# Patient Record
Sex: FEMALE | Race: WHITE | Employment: FULL TIME | URBAN - METROPOLITAN AREA
[De-identification: names, ages, dates, MRNs, and addresses within clinical notes are randomized per-mention and may not be internally consistent; named-entity substitution may affect disease eponyms.]

---

## 2019-07-22 ENCOUNTER — TELEPHONE (OUTPATIENT)
Dept: SURGERY | Age: 64
End: 2019-07-22

## 2019-07-22 NOTE — TELEPHONE ENCOUNTER
Called home and cell numbers. No answer. I left a vm message for her to call our office to schedule an appointment with Dr. Lubna Vasquez.

## 2019-07-29 ENCOUNTER — TELEPHONE (OUTPATIENT)
Dept: SURGERY | Age: 64
End: 2019-07-29

## 2019-07-29 NOTE — TELEPHONE ENCOUNTER
Patient called back after receiving a phone message to  her per fax referral from Dr. Margie Alas for skin tags. Patient stated that she wants a female doctor and that she will call Dr. Margie Alas back.

## 2020-06-11 ENCOUNTER — HOSPITAL ENCOUNTER (OUTPATIENT)
Dept: PHYSICAL THERAPY | Age: 65
Discharge: HOME | End: 2020-06-11
Payer: COMMERCIAL

## 2020-06-11 PROCEDURE — 97112 NEUROMUSCULAR REEDUCATION: CPT

## 2020-06-11 PROCEDURE — 97161 PT EVAL LOW COMPLEX 20 MIN: CPT

## 2020-06-11 NOTE — PROGRESS NOTES
PHYSICAL THERAPY - DAILY TREATMENT NOTE    Patient Name: Bulmaro Camera        Date: 2020  : 1955   Yes Patient  Verified  Visit #:   1     Insurance: Payor: Kaizena / Plan: [address] [place] [address] / Product Type: PPO /      In time: 4:02pm Out time: 4:36pm   Total Treatment Time: 34     Medicare/[facility] Time Tracking (below)   Total Timed Codes (min):  8 1:1 Treatment Time:  8     TREATMENT AREA =  Vestibular disorder [H81.90]    SUBJECTIVE  Pain Level (on 0 to 10 scale):  0  / 10   Medication Changes/New allergies or changes in medical history, any new surgeries or procedures?    no  If yes, update Summary List   Subjective Functional Status/Changes:  []  No changes reported     See POC         OBJECTIVE  Modalities Rationale:     8 min Neuromuscular Re-ed: [x]  See flow sheet   Rationale:    improve coordination, improve balance and increase proprioception to improve the patients ability to perform safe and sx free functional mobility. Billed With/As:   [x] TE   [] TA   [] Neuro   [] Self Care Patient Education: [x] Review HEP    [x] Progressed/Changed HEP based on:   [x] positioning   [x] body mechanics   [] transfers   [] heat/ice application    [] other:      Other Objective/Functional Measures:    See POC     Post Treatment Pain Level (on 0 to 10) scale:   0  / 10     ASSESSMENT  Assessment/Changes in Function:     See POC     []  See Progress Note/Recertification   Patient will continue to benefit from skilled PT services to modify and progress therapeutic interventions, address functional mobility deficits, address ROM deficits, address strength deficits, analyze and address soft tissue restrictions, analyze and cue movement patterns, analyze and modify body mechanics/ergonomics, assess and modify postural abnormalities and instruct in home and community integration to attain remaining goals.    Progress toward goals / Updated goals:    See newly established goals in POC PLAN  [x]  Upgrade activities as tolerated yes Continue plan of care   []  Discharge due to :    []  Other:      Therapist: Fabiana Valdivia    Date: 6/11/2020 Time: 1:42 PM     Future Appointments   Date Time Provider Lenore Sinha   6/11/2020  4:00 PM Aristeo Arevalo

## 2020-06-11 NOTE — PROGRESS NOTES
[address] PHYSICAL THERAPY  [address] [place] [address] [address],Svitlana Oconnor, [facility] - Phone: [phone]  Fax: [phone] / [address] [facility]  Patient Name: Michelle Pfeiffer : 1955   Medical   Diagnosis: Vestibular disorder [H81.90] Treatment Diagnosis: Vestibular disorder [H81.90]   Onset Date: 2020     Referral Source: Srinath Vann MD [facility]): 2020   Prior Hospitalization: See medical history Provider #: [id number]   Prior Level of Function: Symptom free lying down and neck extension; Daily ambulation    Comorbidities: Allergies, BMI >30, Kidney Stone, LBP, HTN, Hypercholesterolemia, Stent, Skin Cancer   Medications: Verified on Patient Summary List   The Plan of Care and following information is based on the information from the initial evaluation.   ===========================================================================================  Assessment / key information: Patient is 77 y.o. female who [facility] to [facility]. with diagnosis of Vestibular disorder [H81.90]. Patient reports spinning with supine lying 2020. Symptoms include dizziness, imbalance, and spinning. Symptoms increase with lying supine, neck extension, and turning the head quickly right. Patient denies hx of falls. Upon objective evaluation, patient demonstrates impaired use of vestibular input, decreased use hip/ankle balance strategies, path deviations with gait, and impaired static and dynamic standing balance. Shweta Vianey on the right was cleared and  positive for rotary nystagmus and symptoms including spinning (</=15\"). Patient scored 25/30 on FGA indicating decreased risk of falls with ambulation. Patient scored 24/100 (low handicap) on Dizziness Handicap Inventory indicating decreased quality of life and impaired functional mobility secondary to dizziness.  Patient can benefit from PT interventions to decrease r/o fall, improve safety with ADL's, & decrease sx with ADL's & to improve overall functional status.  ===========================================================================================  Eval Complexity: History HIGH Complexity :3+ comorbidities / personal factors will impact the outcome/ POC ;  Examination  HIGH Complexity : 4+ Standardized tests and measures addressing body structure, function, activity limitation and / or participation in recreation ; Presentation MEDIUM Complexity : Evolving with changing characteristics ; Decision Making Other outcome measures DHI  LOW ; Overall Complexity LOW   Problem List: decrease ROM, decrease strength, impaired gait/ balance, decrease ADL/ functional abilities, decrease activity tolerance, decrease flexibility/ joint mobility, decrease transfer abilities and other dizziness affecting functional mobility    Treatment Plan may include any combination of the following: Therapeutic exercise, Therapeutic activities, Neuromuscular re-education, Physical agent/modality, Gait/balance training, Manual therapy, Patient education, Self Care training, Functional mobility training, Home safety training and Stair training  Patient / Family readiness to learn indicated by: asking questions, trying to perform skills and interest  Persons(s) to be included in education: patient (P)  Barriers to Learning/Limitations: None  Measures taken, if barriers to learning:    Patient Goal (s): \"get rid of the spinning\"   Patient self reported health status: good  Rehabilitation Potential: good   Short Term Goals: To be accomplished in  1  weeks:  1) Patient to be educated in and compliant with post maneuver instructions.  Long Term Goals: To be accomplished in  4  weeks:  1) Patient to report >/=50% improvement in ability to lay supine in bed. 2) Patient to improve score on DHI to 10/100 indicating improved quality of life.    3) Patient to improve score on FGA to 28/30 indicating decreased fall risk with ambulation. Frequency / Duration:   Patient to be seen  1-2  times per week for 6-8  weeks:  Patient / Caregiver education and instruction: self care, activity modification and exercises  Therapist Signature: Andrea Jameson Date: 5/55/2813   Certification Period: 6/11/2020 to 9/10/2020 Time: 1:43 PM   ===========================================================================================  I certify that the above Physical Therapy Services are being furnished while the patient is under my care. I agree with the treatment plan and certify that this therapy is necessary. Physician Signature:        Date:       Time:     Please sign and return to [facility] at [facility]. or you may fax the signed copy to [phone]. Thank you.

## 2020-06-18 NOTE — PROGRESS NOTES
PHYSICAL THERAPY - DAILY TREATMENT NOTE    Patient Name: Lizzie Christina        Date: 2020  : 1955   yes Patient  Verified  Visit #:   2   of     Insurance: Payor: Petty Guadalupe / Plan: [address] [place] [address] / Product Type: PPO /      In time: 8:04 Out time: 8:24   Total Treatment Time: 20     Medicare/[employer] Time Tracking (below)   Total Timed Codes (min):  20 1:1 Treatment Time:  20     TREATMENT AREA =  Vestibular disorder [H81.90]    SUBJECTIVE  Pain Level (on 0 to 10 scale):  0  / 10   Medication Changes/New allergies or changes in medical history, any new surgeries or procedures?    no  If yes, update Summary List   Subjective Functional Status/Changes:  []  No changes reported     Vivien noticed when I go to bed vivien had less spinning. im not feeling as much when I tilt my head back         OBJECTIVE  Modalities Rationale:   N/A    4 min Neuro Re-education: Post Maneuver education and recommended instructions sleeping semi- recumbent (45 deg angle) for two nights; avoid sleeping in R sidelying for 1 week, maintaining vertical head alignment with ADLs, avoid going to  and dentist   Rationale:   Improve standing proprioception to improve patients ability to balance in the shower and stand at night    16 min Manual Therapy: Hallpike julianna on L (-); and Epley's maneuver in order to correct right BPPV   Rationale:      Decrease positional vertigo and dizziness to improve patient's ability to lie in bed and extend neck.     Billed With/As:   [] TE   [] TA   [x] Neuro   [] Self Care Patient Education: [x] Review HEP    [] Progressed/Changed HEP based on:   [] positioning   [x] body mechanics   [x] transfers   [] heat/ice application    [x] other: Gait and static standing balance     Other Objective/Functional Measures:    Decreased nystagmus and sx with R Hallpike compared to initial eval   Slight sx with transition from L S/L to sitting with L C/S rotation       Post Treatment Pain Level (on 0 to 10) scale:   0  / 10     ASSESSMENT  Assessment/Changes in Function:     Good tolerance for Epley maneuver for R BPPV, denies patient and dizziness post session. Patient educated in post maneuver guidelines in order to improve efficacy of maneuver. Patient instructed to wait in waiting room 10 minutes prior to driving home in order to avoid quick spins or brief bursts of vertigo as debris repositions itself immediately post maneuver. []  See Progress Note/Recertification   Patient will continue to benefit from skilled PT services to modify and progress therapeutic interventions, address functional mobility deficits, address ROM deficits, address strength deficits, analyze and cue movement patterns, analyze and modify body mechanics/ergonomics, assess and modify postural abnormalities, address imbalance/dizziness and instruct in home and community integration to attain remaining goals. Progress toward goals / Updated goals:    First visit after initial evaluation. Progress tx per POC.          PLAN  [x]  Upgrade activities as tolerated yes Continue plan of care   []  Discharge due to :    []  Other:      Therapist: Kodi Kwan    Date: 6/19/2020 Time: 10:51 AM     Future Appointments   Date Time Provider Lenore Sinha   6/24/2020  1:45 PM Merrilee Hight [facility]   6/29/2020  9:30 AM Anni Villa   7/15/2020  8:45 AM Desiree Chavez MD [facility]

## 2020-06-19 ENCOUNTER — HOSPITAL ENCOUNTER (OUTPATIENT)
Dept: PHYSICAL THERAPY | Age: 65
Discharge: HOME | End: 2020-06-19
Payer: COMMERCIAL

## 2020-06-19 PROCEDURE — 97140 MANUAL THERAPY 1/> REGIONS: CPT

## 2020-06-24 ENCOUNTER — HOSPITAL ENCOUNTER (OUTPATIENT)
Dept: PHYSICAL THERAPY | Age: 65
Discharge: HOME | End: 2020-06-24
Payer: COMMERCIAL

## 2020-06-24 PROCEDURE — 97140 MANUAL THERAPY 1/> REGIONS: CPT

## 2020-06-24 NOTE — PROGRESS NOTES
PHYSICAL THERAPY - DAILY TREATMENT NOTE    Patient Name: Xenia Parra        Date: 2020  : 1955   yes Patient  Verified  Visit #:  3     Insurance: Payor: Katie Nava / Plan: [address] [place] [address] / Product Type: PPO /      In time: 1:46 Out time: 2:01   Total Treatment Time: 15     Medicare/[facility] Time Tracking (below)   Total Timed Codes (min):  15 1:1 Treatment Time: 15     TREATMENT AREA =  Vestibular disorder [H81.90]    SUBJECTIVE  Pain Level (on 0 to 10 scale):  0  / 10   Medication Changes/New allergies or changes in medical history, any new surgeries or procedures?    no  If yes, update Summary List   Subjective Functional Status/Changes:  []  No changes reported     Swimming, went back to the locker room, my stuff in my locker and tilted my head to the right and started spinning. Sometimes at night if I go to lay down ill feel something and then sometimes I don't. As I was walking down the road I felt a lean while walking. OBJECTIVE  Modalities Rationale:   N/A    5 min Neuro Re-education: Post Maneuver education and recommended instructions sleeping semi- recumbent (45 deg angle) for two nights; avoid sleeping in R sidelying for 1 week, maintaining vertical head alignment with ADLs, avoid going to  and dentist   Rationale:   Improve standing proprioception to improve patients ability to balance in the shower and stand at night    10 min Manual Therapy: Hallpike julianna on R (+); and Epley's maneuver in order to correct right BPPV   Rationale:      Decrease positional vertigo and dizziness to improve patient's ability to lie in bed and extend neck.     Billed With/As:   [] TE   [] TA   [x] Neuro   [] Self Care Patient Education: [x] Review HEP    [] Progressed/Changed HEP based on:   [] positioning   [x] body mechanics   [x] transfers   [] heat/ice application    [x] other: Gait and static standing balance     Other Objective/Functional Measures:    Demonstrated marked nystagmus with R hallpike position for ~15 seconds  Slight sx with transition from supine with left rot and ext to S/L with L rotation and slight c/s flexion    Post Treatment Pain Level (on 0 to 10) scale:   0  / 10     ASSESSMENT  Assessment/Changes in Function:     Good tolerance for Epley maneuver for R BPPV, denies patient and dizziness post session. Educated patient in possible need for cervical collar in order to prevent patient from tipping head back for prolonged periods due to bifocals. Patient educated in post maneuver guidelines in order to improve efficacy of maneuver. Patient instructed to wait in waiting room 10 minutes prior to driving home in order to avoid quick spins or brief bursts of vertigo as debris repositions itself immediately post maneuver. []  See Progress Note/Recertification   Patient will continue to benefit from skilled PT services to modify and progress therapeutic interventions, address functional mobility deficits, address ROM deficits, address strength deficits, analyze and cue movement patterns, analyze and modify body mechanics/ergonomics, assess and modify postural abnormalities, address imbalance/dizziness and instruct in home and community integration to attain remaining goals. Progress toward goals / Updated goals:    1) Patient to be educated in and compliant with post maneuver instructions. Goal met (6/19/2020)     · Long Term Goals: To be accomplished in  4  weeks:  1) Patient to report >/=50% improvement in ability to lay supine in bed. Goal in progress  2) Patient to improve score on DHI to 10/100 indicating improved quality of life. 3) Patient to improve score on FGA to 28/30 indicating decreased fall risk with ambulation.             PLAN  [x]  Upgrade activities as tolerated yes Continue plan of care   []  Discharge due to :    []  Other:      Therapist: Yunier Mayen    Date: 6/24/2020 Time: 10:51 AM     Future Appointments   Date Time Provider Lenore Sinha   6/24/2020  1:45 PM Bk Tolbert [facility]   6/29/2020  9:30 AM Ian Dean [facility]   7/15/2020  8:45 AM Gerard Nunn MD [facility]

## 2020-06-29 ENCOUNTER — HOSPITAL ENCOUNTER (OUTPATIENT)
Dept: PHYSICAL THERAPY | Age: 65
Discharge: HOME | End: 2020-06-29
Payer: COMMERCIAL

## 2020-06-29 PROCEDURE — 97140 MANUAL THERAPY 1/> REGIONS: CPT

## 2020-06-29 NOTE — PROGRESS NOTES
PHYSICAL THERAPY - DAILY TREATMENT NOTE    Patient Name: Moriah Ordaz        Date: 2020  : 1955   yes Patient  Verified  Visit #:  4    Insurance: Payor: Clemente Goodpasture / Plan: [address] [place] [address] / Product Type: PPO /      In time: 9:32 Out time: 9:50   Total Treatment Time: 18     Medicare/[facility] Time Tracking (below)   Total Timed Codes (min):  18 1:1 Treatment Time: 18     TREATMENT AREA =  Vestibular disorder [H81.90]    SUBJECTIVE  Pain Level (on 0 to 10 scale):  0  / 10   Medication Changes/New allergies or changes in medical history, any new surgeries or procedures?    no  If yes, update Summary List   Subjective Functional Status/Changes:  []  No changes reported     Last night I went on a walk (leaning to the left) and felt myself drifting, this mornign didn't have any trouble. At night when I get into bed its not bothering me at all unless I turn my head to the right, it doesn't seems to be lasting as along, when I first go this - laying flat would make me dizzy now its changed to turning head to the right. This week I didn't do too bad, the only thing I did where I got a sensation she showed me a quilt - went to look up at it and I felt it           OBJECTIVE  Modalities Rationale:   N/A    8 min Neuro Re-education: Post Maneuver education and recommended instructions sleeping semi- recumbent (45 deg angle) for two nights; avoid sleeping in R sidelying for 1 week, maintaining vertical head alignment with ADLs, avoid going to  and dentist   Rationale:   Improve standing proprioception to improve patients ability to balance in the shower and stand at night    10 min Manual Therapy: Hallpike julianna on R (+); and Epley's maneuver in order to correct right BPPV   Rationale:      Decrease positional vertigo and dizziness to improve patient's ability to lie in bed and extend neck.     Billed With/As:   [] GIA   [] ARTURO   [x] Neuro   [] Self Care Patient Education: [x] Review HEP    [] Progressed/Changed HEP based on:   [] positioning   [x] body mechanics   [x] transfers   [] heat/ice application    [x] other: Johnson Caleb Daroff exercises      Other Objective/Functional Measures:    Demonstrated nystagmus with R hallpike position for ~13 seconds  Denies increased sx t/o maneuver   Post Treatment Pain Level (on 0 to 10) scale:   0  / 10     ASSESSMENT  Assessment/Changes in Function:     Good tolerance for Epley maneuver for R BPPV, denies patient and dizziness post session. Patient education in [address]. Patient educated in post maneuver guidelines in order to improve efficacy of maneuver. Patient instructed to wait in waiting room 10 minutes prior to driving home in order to avoid quick spins or brief bursts of vertigo as debris repositions itself immediately post maneuver. []  See Progress Note/Recertification   Patient will continue to benefit from skilled PT services to modify and progress therapeutic interventions, address functional mobility deficits, address ROM deficits, address strength deficits, analyze and cue movement patterns, analyze and modify body mechanics/ergonomics, assess and modify postural abnormalities, address imbalance/dizziness and instruct in home and community integration to attain remaining goals. Progress toward goals / Updated goals:    1) Patient to be educated in and compliant with post maneuver instructions. Goal met (6/29/2020)     · Long Term Goals: To be accomplished in  4  weeks:  1) Patient to report >/=50% improvement in ability to lay supine in bed. Goal in progress reports one episode of dizziness with looking up   2) Patient to improve score on DHI to 10/100 indicating improved quality of life. 3) Patient to improve score on FGA to 28/30 indicating decreased fall risk with ambulation.             PLAN  [x]  Upgrade activities as tolerated yes Continue plan of care   []  Discharge due to :    []  Other:      Therapist: Neal Garcia Jeffy Dominguez    Date: 6/29/2020 Time: 10:51 AM     Future Appointments   Date Time Provider Lenore Sinha   6/29/2020  9:30 AM Nicole Pratt [facility]   7/15/2020  8:45 AM Kraen Palma MD [facility]

## 2020-07-07 ENCOUNTER — HOSPITAL ENCOUNTER (OUTPATIENT)
Dept: PHYSICAL THERAPY | Age: 65
End: 2020-07-07

## 2020-07-07 NOTE — PROGRESS NOTES
[address] PHYSICAL THERAPY   Rolanda Paulino [address],[facility], [facility] - Phone: [phone]  Fax: [phone]  DISCHARGE SUMMARY  Patient Name: Joan Thakkar : 1955   Treatment/Medical Diagnosis: Vestibular disorder [H81.90]   Referral Source: Jihan Valadez MD     Date of Initial Visit: 2020 Attended Visits: 4 Missed Visits: 1     SUMMARY OF TREATMENT  Patient has attended 4 PT sessions, including an initial evaluation, for dizziness. PT has included manual therapy - Epley's Maneuver, static standing balance, Aubree Sleeper, patient education, and home exercise program to decrease dizziness and spinning with bed mobility. CURRENT STATUS  The pt has progressed well with therapy, consistently reporting decreased dizziness and improved sx with functional mobility. Patient underwent Epley's Maneuver for R BPPV and currently denies sx and nystagmus with R Boris. [address] decreased from 24/100 to 0/100. Denies sx with gardening, laying supine, quick head turns, and looking up. Based on progress with participation in PT services and pt reported improvement, patient is appropriate for DC to home mgt of sx at this time. Goal/Measure of Progress Goal Met? 1. Patient to be educated in and compliant with post maneuver instructions. Status at last Eval: Goal Established Current Status: Compliant with post maneuver instructions yes   2. Patient to report >/=50% improvement in ability to lay supine in bed. Status at last Eval: Spinning with laying in supine Current Status: Denies dizziness with laying in supine yes   3. Patient to improve score on DHI to 10/100 indicating improved quality of life   Status at last Eval: [address] = 24/100 Current Status: [address] = 0/100 yes   4. Patient to improve score on FGA to 28/30 indicating decreased fall risk with ambulation.     Status at last Eval: FGA = 25/100 Current Status: Unable to be assessed no RECOMMENDATIONS  Discontinue therapy. Progressing towards or have reached established goals. If you have any questions/comments please contact us directly at [phone]. Thank you for allowing us to assist in the care of your patient.     Therapist Signature: Deann Stone Date: 6/29/2020     Time: 8:06 AM

## 2022-01-03 DIAGNOSIS — M25.561 RIGHT KNEE PAIN, UNSPECIFIED CHRONICITY: Primary | ICD-10-CM

## 2022-01-07 ENCOUNTER — OFFICE VISIT (OUTPATIENT)
Dept: ORTHOPEDIC SURGERY | Age: 67
End: 2022-01-07
Payer: COMMERCIAL

## 2022-01-07 VITALS — HEIGHT: 63 IN | BODY MASS INDEX: 30.48 KG/M2 | WEIGHT: 172 LBS

## 2022-01-07 DIAGNOSIS — M25.561 RIGHT KNEE PAIN, UNSPECIFIED CHRONICITY: Primary | ICD-10-CM

## 2022-01-07 PROCEDURE — 99203 OFFICE O/P NEW LOW 30 MIN: CPT | Performed by: ORTHOPAEDIC SURGERY

## 2022-01-07 NOTE — PROGRESS NOTES
Name: Micheal Phillips    : 1955     Service Dept: [address] and Sports Medicine    Chief Complaint   Patient presents with    Knee Pain        Visit Vitals  Ht 5' 3\" (1.6 m)   Wt 172 lb (78 kg)   BMI 30.47 kg/m²        No Known Allergies     Current Outpatient Medications   Medication Sig Dispense Refill    aspirin delayed-release (Aspirin Low Dose) 81 mg tablet Take  by mouth.  Bepreve 1.5 % drop instill 1 drop INTO AFFECTED EYE(S) twice a day      ciprofloxacin HCl (CILOXAN) 0.3 % ophthalmic solution APPLY 1 DROP INTO RIGHT EYE 3 TIMES A DAY. START 2 DAYS PRIOR TO SURGERY IN THE FIRST SURGERY EYE      Premarin 0.625 mg/gram vaginal cream INSERT 0.5 GRAMS TWICE A WEEK BY VAGINAL ROUTE      fexofenadine (ALLEGRA) 60 mg tablet Take  by mouth.  ketorolac (ACULAR) 0.5 % ophthalmic solution APPLY 1 DROP INTO RIGHT EYE 3 TIMES A DAY. START 2 DAYS PRIOR TO SURGERY      prednisoLONE acetate (PRED FORTE) 1 % ophthalmic suspension apply 1 drop into right eye three times a day      nitrofurantoin (MACRODANTIN) 100 mg capsule Take 1 Cap by mouth two (2) times daily (with meals). 10 Cap 0    ferrous sulfate 324 mg (65 mg iron) tablet take 1 tablet by mouth once daily with ORANGE JUICE      omeprazole (PRILOSEC) 40 mg capsule take 1 capsule by mouth once daily 30 MINUTES BEFORE BREAKFAST      triamcinolone (NASACORT AQ) 55 mcg nasal inhaler 2 Sprays daily.  nitroglycerin (NITROSTAT) 0.4 mg SL tablet 0.4 mg by SubLINGual route.  atorvastatin (LIPITOR) 80 mg tablet take 1 tablet by mouth at bedtime      clotrimazole-betamethasone (Lotrisone) topical cream Lotrisone 1 %-0.05 % topical cream   APPLY TO THE AFFECTED AND SURROUNDING AREAS OF SKIN BY TOPICAL ROUTE 2 TIMES PER DAY IN THE MORNING AND EVENING FOR 2 WEEKS      losartan potassium (COZAAR PO) losartan        There is no problem list on file for this patient.      Family History   Problem Relation Age of Onset    Lung Cancer Father         COD    Colon Cancer Paternal Aunt         COD    Lung Cancer Maternal Aunt         COD      Social History     Socioeconomic History    Marital status:    Tobacco Use    Smoking status: Never Smoker    Smokeless tobacco: Never Used   Substance and Sexual Activity    Alcohol use: Yes     Alcohol/week: 1.0 standard drink     Types: 1 Glasses of wine per week      Past Surgical History:   Procedure Laterality Date    HX CATARACT REMOVAL Right 2021    HX CORONARY STENT PLACEMENT        Past Medical History:   Diagnosis Date    H/O heart artery stent     High cholesterol     Hypertension     Vertigo         I have reviewed and agree with [facility] and ROS and intake form in chart and the record furthermore I have reviewed prior medical record(s) regarding this patients care during this appointment. Review of Systems:   Patient is a pleasant appearing individual, appropriately dressed, well hydrated, well nourished, who is alert, appropriately oriented for age, and in no acute distress with a normal gait and normal affect who does not appear to be in any significant pain. Physical Exam:  Right Knee - Slight decrease range of motion, Grossly neurovascularly intact, Good cap refill, No skin lesions, Mild swelling, No gross instability, Some Quadriceps weakness, Positive medial joint line tenderness, Positive Fartun's exam    Left Knee - Normal range of motion, Grossly neurovascularly intact, Good cap refill, No skin lesions, No swelling, No gross instability, No weakness, No medial joint line tenderness, Negative Fartun's exam   Encounter Diagnoses     ICD-10-CM ICD-9-CM   1. Right knee pain, unspecified chronicity  M25.561 719.46       HPI:  The patient is here with a chief complaint of bilateral knee pain. Throbbing, burning pain. Progressively getting worse. Pain is 9/10.     X-rays of the right knee are unremarkable except for mild-to-moderate OA.    Assessment/Plan:  Plan will be for an MRI of the right knee rule out meniscus tear. I will see the patient post MRI and go from there. As part of continued conservative pain management options the patient was advised to utilize Tylenol or OTC NSAIDS as long as it is not medically contraindicated. Return to Office: Follow-up and Dispositions    · Return for POST MRI. Scribed by Joanna Hardin LPN as dictated by [facility] [place] COLT Smith MD.  Documentation True and Accepted [facility] COLT Smith MD

## 2022-01-07 NOTE — PATIENT INSTRUCTIONS
Meniscus Tear: Care Instructions  Overview     The meniscus is rubbery tissue in the knee that acts as a shock absorber between the upper and lower leg bones. The meniscus also keeps your knee stable by spreading weight across it. Each knee has two menisci (plural of meniscus). You can tear a meniscus if you plant your foot and twist, or pivot. The meniscus also can wear down as you age, and it can tear from squatting or kneeling. Small tears may heal on their own with rest and some physical therapy. But a more serious tear may need surgery to repair it or to remove part of the meniscus. Your doctor may want you to see a doctor who specializes in bones and sports injuries. Follow-up care is a key part of your treatment and safety. Be sure to make and go to all appointments, and call your doctor if you are having problems. It's also a good idea to know your test results and keep a list of the medicines you take. How can you care for yourself at home? · Rest your knee when possible. · Do not squat or kneel. · Take pain medicines exactly as directed. ? If the doctor gave you a prescription medicine for pain, take it as prescribed. ? If you are not taking a prescription pain medicine, ask your doctor if you can take an over-the-counter medicine. · Put ice or a cold pack on your knee for 10 to 20 minutes at a time. Try to do this every 1 to 2 hours for the next 3 days (when you are awake) or until the swelling goes down. Put a thin cloth between the ice and your skin. · Prop up the sore leg on a pillow when you ice your knee or any time you sit or lie down during the next 3 days. Try to keep your leg above the level of your heart. This will help reduce swelling. · Follow your doctor's directions for using crutches or a knee brace, if suggested. · Follow your doctor's directions for exercises to keep your knee mobile and your leg muscles strong.  Here are a few exercises you can try if your doctor says it is okay. ? Quad sets: Lie down on the floor or the bed with your injured leg straight. Fully extend your legthere should be no or little bend in your knee. Tighten the thigh (quadriceps) of your injured leg for 6 seconds. Do not lift your heel up. Relax your quadriceps for 10 seconds. Repeat this exercise 8 to 12 times several times during the day. ? Straight-leg raises: Lie down on the floor or the bed with your injured leg flat and your uninjured leg bent so that the bottom of your foot is on the floor or bed. Tighten the quadriceps of your injured leg. Keeping your knee as straight as possible, lift your injured leg off the bed until it is about 18 inches above the bed or floor. Lower your leg back down and relax for 5 seconds. Do 3 sets of 20 repetitions, or if you tire quickly, 3 sets of 8 to 12 repetitions. ? Heel raises: Stand with your feet a few inches apart. Rest your hands lightly on a counter or chair in front of you. Slowly raise your heels off the floor while keeping your knees straight. Hold for 3 seconds, then slowly lower your heels to the floor. Do 3 sets of 8 to 12 repetitions. ? Heel slides: Lie down on the floor or the bed with your leg flat. Slowly begin to slide your heel toward your rear end (buttocks), keeping your heel on the floor. Your knee will begin to bend. Slide your heel and bend your knee until it becomes a little sore and you can feel a small amount of pressure inside your knee. Hold this position for 10 seconds. Slide your heel back down until your leg is straight on the floor. Relax for 10 seconds. Repeat this exercise 20 times. When should you call for help? Watch closely for changes in your health, and be sure to contact your doctor if:    · You have increasing knee pain or swelling or both.     · Your knee is so sore or stiff that you cannot walk on it.     · You do not get better as expected. Where can you learn more?   Go to http://www.gray.com/  Enter [id number] in the search box to learn more about \"Meniscus Tear: Care Instructions. \"  Current as of: July 1, 2021               Content Version: 13.0  © 2020-0489 [employer]. Care instructions adapted under license by [employer] (which disclaims liability or warranty for this information). If you have questions about a medical condition or this instruction, always ask your healthcare professional. [address] any warranty or liability for your use of this information.

## 2022-01-07 NOTE — LETTER
1/10/2022    Patient: Jordy Vick   YOB: 1955   Date of Visit: 1/7/2022     Moe Gutierrez MD  [address] [place] [id number]  Via Fax: [phone]    Dear Moe Gutierrez MD,      Thank you for referring Ms. Tom Kim to [address] [place] [address] AND SPORTS MEDICINE for evaluation. My notes for this consultation are attached. If you have questions, please do not hesitate to call me. I look forward to following your patient along with you.       Sincerely,    Alejo Bloch, MD

## 2022-01-10 DIAGNOSIS — M25.561 RIGHT KNEE PAIN, UNSPECIFIED CHRONICITY: ICD-10-CM

## 2022-02-09 DIAGNOSIS — M25.561 RIGHT KNEE PAIN, UNSPECIFIED CHRONICITY: ICD-10-CM

## 2022-02-11 ENCOUNTER — OFFICE VISIT (OUTPATIENT)
Dept: ORTHOPEDIC SURGERY | Age: 67
End: 2022-02-11
Payer: COMMERCIAL

## 2022-02-11 DIAGNOSIS — M17.11 OSTEOARTHRITIS OF RIGHT KNEE, UNSPECIFIED OSTEOARTHRITIS TYPE: ICD-10-CM

## 2022-02-11 DIAGNOSIS — M25.561 RIGHT KNEE PAIN, UNSPECIFIED CHRONICITY: Primary | ICD-10-CM

## 2022-02-11 PROCEDURE — 99213 OFFICE O/P EST LOW 20 MIN: CPT | Performed by: ORTHOPAEDIC SURGERY

## 2022-02-11 RX ORDER — DIFLUPREDNATE 0.5 MG/ML
EMULSION OPHTHALMIC
COMMUNITY
Start: 2022-01-10

## 2022-02-11 RX ORDER — DOXYCYCLINE 100 MG/1
100 CAPSULE ORAL 2 TIMES DAILY
COMMUNITY
Start: 2021-12-21 | End: 2022-06-22 | Stop reason: ALTCHOICE

## 2022-02-11 RX ORDER — LOSARTAN POTASSIUM 25 MG/1
25 TABLET ORAL DAILY
COMMUNITY
Start: 2022-01-25

## 2022-02-11 RX ORDER — NEOMYCIN SULFATE, POLYMYXIN B SULFATE, AND DEXAMETHASONE 3.5; 10000; 1 MG/G; [USP'U]/G; MG/G
OINTMENT OPHTHALMIC
COMMUNITY
Start: 2021-12-09 | End: 2022-06-22 | Stop reason: ALTCHOICE

## 2022-02-11 RX ORDER — ERGOCALCIFEROL 1.25 MG/1
50000 CAPSULE ORAL DAILY
COMMUNITY
Start: 2022-02-08

## 2022-02-11 RX ORDER — LOTEPREDNOL ETABONATE AND TOBRAMYCIN 5; 3 MG/ML; MG/ML
SUSPENSION/ DROPS OPHTHALMIC
COMMUNITY
Start: 2021-11-10 | End: 2022-06-22 | Stop reason: ALTCHOICE

## 2022-02-11 NOTE — LETTER
2/15/2022    Patient: Alida Saucedo   YOB: 1955   Date of Visit: 2/11/2022     Mamta Rosario MD  [address] [place] [id number]  Via Fax: [phone]    Dear Mamta Rosario MD,      Thank you for referring Ms. Nighat Tee to [facility] AND SPORTS MEDICINE for evaluation. My notes for this consultation are attached. If you have questions, please do not hesitate to call me. I look forward to following your patient along with you.       Sincerely,    Holly Eppreson MD

## 2022-02-14 NOTE — PATIENT INSTRUCTIONS
Knee Arthritis: Care Instructions  Your Care Instructions     Knee arthritis is a breakdown of the cartilage that cushions your knee joint. When the cartilage wears down, your bones rub against each other. This causes pain and stiffness. Knee arthritis tends to get worse with time. Treatment for knee arthritis involves reducing pain, making the leg muscles stronger, and staying at a healthy body weight. The treatment usually does not improve the health of the cartilage, but it can reduce pain and improve how well your knee works. You can take simple measures to protect your knee joints, ease your pain, and help you stay active. Follow-up care is a key part of your treatment and safety. Be sure to make and go to all appointments, and call your doctor if you are having problems. It's also a good idea to know your test results and keep a list of the medicines you take. How can you care for yourself at home? · Know that knee arthritis will cause more pain on some days than on others. · Stay at a healthy weight. Lose weight if you are overweight. When you stand up, the pressure on your knees from every pound of body weight is multiplied four times. So if you lose 10 pounds, you will reduce the pressure on your knees by 40 pounds. · Talk to your doctor or physical therapist about exercises that will help ease joint pain. ? Stretch to help prevent stiffness and to prevent injury before you exercise. You may enjoy gentle forms of yoga to help keep your knee joints and muscles flexible. ? Walk instead of jog.  ? Ride a bike. This makes your thigh muscles stronger and takes pressure off your knee. ? Wear well-fitting and comfortable shoes. ? Exercise in chest-deep water. This can help you exercise longer with less pain. ? Avoid exercises that include squatting or kneeling. They can put a lot of strain on your knees.   ? Talk to your doctor to make sure that the exercise you do is not making the arthritis worse.  · Do not sit for long periods of time. Try to walk once in a while to keep your knee from getting stiff. · Ask your doctor or physical therapist whether shoe inserts may reduce your arthritis pain. · If you can afford it, get new athletic shoes at least every year. This can help reduce the strain on your knees. · Use a device to help you do everyday activities. ? A cane or walking stick can help you keep your balance when you walk. Hold the cane or walking stick in the hand opposite the painful knee. ? If you feel like you may fall when you walk, try using crutches or a front-wheeled walker. These can prevent falls that could cause more damage to your knee. ? A knee brace may help keep your knee stable and prevent pain. ? You also can use other things to make life easier, such as a higher toilet seat and handrails in the bathtub or shower. · Take pain medicines exactly as directed. ? Do not wait until you are in severe pain. You will get better results if you take it sooner. ? If you are not taking a prescription pain medicine, take an over-the-counter medicine such as acetaminophen (Tylenol), ibuprofen (Advil, Motrin), or naproxen (Aleve). Read and follow all instructions on the label. ? Do not take two or more pain medicines at the same time unless the doctor told you to. Many pain medicines have acetaminophen, which is Tylenol. Too much acetaminophen (Tylenol) can be harmful. ? Tell your doctor if you take a blood thinner, have diabetes, or have allergies to shellfish. · Ask your doctor if you might benefit from a shot of steroid medicine into your knee. This may provide pain relief for several months. · Many people take the supplements glucosamine and chondroitin for osteoarthritis. Some people feel they help, but the medical research does not show that they work. Talk to your doctor before you take these supplements. When should you call for help?    Call your doctor now or seek immediate medical care if:    · You have sudden swelling, warmth, or pain in your knee.     · You have knee pain and a fever or rash.     · You have such bad pain that you cannot use your knee. Watch closely for changes in your health, and be sure to contact your doctor if you have any problems. Where can you learn more? Go to http://www.gray.com/  Enter W187 in the search box to learn more about \"Knee Arthritis: Care Instructions. \"  Current as of: April 30, 2021               Content Version: 13.0  © 1728-8914 [employer]. Care instructions adapted under license by [employer] (which disclaims liability or warranty for this information). If you have questions about a medical condition or this instruction, always ask your healthcare professional. [address] any warranty or liability for your use of this information.

## 2022-02-14 NOTE — PROGRESS NOTES
Name: Erika Ling    : 1955     Service Dept: [address] and Sports Medicine    Chief Complaint   Patient presents with    Knee Pain        There were no vitals taken for this visit. No Known Allergies     Current Outpatient Medications   Medication Sig Dispense Refill    difluprednate (DUREZOL) 0.05 % ophthalmic emulsion INSTILL 1 DROP IN LEFT EYE FOUR TIMES A DAY      doxycycline (VIBRAMYCIN) 100 mg capsule Take 100 mg by mouth two (2) times a day.  Vitamin D2 1,250 mcg (50,000 unit) capsule       neomycin-polymyxin-dexamethasone (DEXACINE) 3.5 mg/g-10,000 unit/g-0.1 % ophthalmic ointment APPLY A SMALL AMOUNT INTO BOTH EYES AT BEDTIME      Zylet 0.3-0.5 % drps instill 1 drop into both eyes four times a day      losartan (COZAAR) 25 mg tablet Take 25 mg by mouth daily.  aspirin delayed-release (Aspirin Low Dose) 81 mg tablet Take  by mouth.  Bepreve 1.5 % drop instill 1 drop INTO AFFECTED EYE(S) twice a day      ciprofloxacin HCl (CILOXAN) 0.3 % ophthalmic solution APPLY 1 DROP INTO RIGHT EYE 3 TIMES A DAY. START 2 DAYS PRIOR TO SURGERY IN THE FIRST SURGERY EYE      Premarin 0.625 mg/gram vaginal cream INSERT 0.5 GRAMS TWICE A WEEK BY VAGINAL ROUTE      fexofenadine (ALLEGRA) 60 mg tablet Take  by mouth.  ketorolac (ACULAR) 0.5 % ophthalmic solution APPLY 1 DROP INTO RIGHT EYE 3 TIMES A DAY. START 2 DAYS PRIOR TO SURGERY      prednisoLONE acetate (PRED FORTE) 1 % ophthalmic suspension apply 1 drop into right eye three times a day      nitrofurantoin (MACRODANTIN) 100 mg capsule Take 1 Cap by mouth two (2) times daily (with meals). 10 Cap 0    ferrous sulfate 324 mg (65 mg iron) tablet take 1 tablet by mouth once daily with ORANGE JUICE      omeprazole (PRILOSEC) 40 mg capsule take 1 capsule by mouth once daily 30 MINUTES BEFORE BREAKFAST      triamcinolone (NASACORT AQ) 55 mcg nasal inhaler 2 Sprays daily.       nitroglycerin (NITROSTAT) 0.4 mg SL tablet 0.4 mg by SubLINGual route.  atorvastatin (LIPITOR) 80 mg tablet take 1 tablet by mouth at bedtime      clotrimazole-betamethasone (Lotrisone) topical cream Lotrisone 1 %-0.05 % topical cream   APPLY TO THE AFFECTED AND SURROUNDING AREAS OF SKIN BY TOPICAL ROUTE 2 TIMES PER DAY IN THE MORNING AND EVENING FOR 2 WEEKS      losartan potassium (COZAAR PO) losartan        There is no problem list on file for this patient. Family History   Problem Relation Age of Onset    Lung Cancer Father         COD    Colon Cancer Paternal Aunt         COD    Lung Cancer Maternal Aunt         COD      Social History     Socioeconomic History    Marital status:    Tobacco Use    Smoking status: Never Smoker    Smokeless tobacco: Never Used   Substance and Sexual Activity    Alcohol use: Yes     Alcohol/week: 1.0 standard drink     Types: 1 Glasses of wine per week      Past Surgical History:   Procedure Laterality Date    HX CATARACT REMOVAL Right 2021    HX CORONARY STENT PLACEMENT        Past Medical History:   Diagnosis Date    H/O heart artery stent     High cholesterol     Hypertension     Vertigo         I have reviewed and agree with [address] [place] [address] Nw and ROS and intake form in chart and the record furthermore I have reviewed prior medical record(s) regarding this patients care during this appointment. Review of Systems:   Patient is a pleasant appearing individual, appropriately dressed, well hydrated, well nourished, who is alert, appropriately oriented for age, and in no acute distress with a normal gait and normal affect who does not appear to be in any significant pain.    Physical Exam:  Right Knee -Decrease range of motion with flexion, Knee arc of greater than 50 degrees, Some crepitation, Grossly neurovascularly intact, Good cap refill, No skin lesion, Moderate swelling, No gross instability, Some quadriceps weakness, Kellgren and Manjeet at least grade 3    Left Knee - Full Range of Motion, No crepitation, Grossly neurovascularly intact, Good cap refill, No skin lesion, No swelling, No gross instability, No quadriceps weakness   Encounter Diagnoses     ICD-10-CM ICD-9-CM   1. Right knee pain, unspecified chronicity  M25.561 719.46   2. Osteoarthritis of right knee, unspecified osteoarthritis type  M17.11 715.96       HPI:  The patient is here with a chief complaint of right knee pain, throbbing, burning pain. It has been the same. Pain is 9/10. X-rays of the right knee are positive for severe OA, and post MRI as well. Assessment/Plan:  We did talk to her about knee replacement. She is going to think about it. We will see her back as needed. Plan will be for right knee replacement, general medical and cardiac clearance. If she gets worse, she will give us a call. No blood thinner history, no blood clot history, and no complication history. As part of continued conservative pain management options the patient was advised to utilize Tylenol or OTC NSAIDS as long as it is not medically contraindicated. Return to Office: Follow-up and Dispositions    · Return if symptoms worsen or fail to improve. Scribed by Isreal Chambers LPN as dictated by [facility] [place] COLT Stewart MD.  Documentation True and Accepted George Stewart MD

## 2022-03-30 ENCOUNTER — HOME HEALTH ADMISSION (OUTPATIENT)
Dept: HOME HEALTH SERVICES | Facility: HOME HEALTH | Age: 67
End: 2022-03-30
Payer: COMMERCIAL

## 2022-04-02 ENCOUNTER — HOME CARE VISIT (OUTPATIENT)
Dept: SCHEDULING | Facility: HOME HEALTH | Age: 67
End: 2022-04-02
Payer: COMMERCIAL

## 2022-04-02 VITALS
OXYGEN SATURATION: 97 % | SYSTOLIC BLOOD PRESSURE: 130 MMHG | RESPIRATION RATE: 17 BRPM | HEART RATE: 99 BPM | TEMPERATURE: 97.5 F | DIASTOLIC BLOOD PRESSURE: 80 MMHG

## 2022-04-02 PROCEDURE — 400018 HH-NO PAY CLAIM PROCEDURE

## 2022-04-02 PROCEDURE — G0151 HHCP-SERV OF PT,EA 15 MIN: HCPCS

## 2022-04-02 PROCEDURE — 400013 HH SOC

## 2022-04-03 ENCOUNTER — HOME CARE VISIT (OUTPATIENT)
Dept: SCHEDULING | Facility: HOME HEALTH | Age: 67
End: 2022-04-03
Payer: COMMERCIAL

## 2022-04-03 PROCEDURE — G0157 HHC PT ASSISTANT EA 15: HCPCS

## 2022-04-03 NOTE — HOME HEALTH
Skilled services/Home bound verification:     Skilled Reason for admission/summary of clinical condition:  R TKR  . This patient is homebound for the following reasons Requires considerable and taxing effort to leave the home . Caregiver: daughter Placido Santiago occasionally as patient lives by herself. Caregiver assists with IADL's. Medications reconciled and all medications are available in the home this visit. High risk medication teaching regarding anticoagulants, hyperglycemic agents or opiod narcotics performed (specify) roxicodone, tramadol for risk of overdose    Dr. Wilbert Mancini notified of any discrepancies/look a like medications/medication interactions no severe interaction noted . Home health supplies by type and quantity ordered/delivered this visit include: none     Patient education provided this visit to include: HEP, fall prevention, dc planning . Patient level of understanding of education provided: Patient was able to partially teach back HEP     Sharps Education Provided: n/a  Patient response to procedure performed:  Patient needed visual cues for safety and technique with proper technique with R knee positioning     Home exercise program/Homework provided: patient was educated with HEP including supine ankle oumps, ankle circles, quad sets, hamstring sets, heel slides and gluteal sets x 20 reps x 3 sets daily to improve ROM and MMT on R knee to improve gait and transfers followed by ice x 20 minutes at a time on R knee to decrease pain and swelling. Patient educated with fall prevention technique by decluttering space, proper use of AD and footwear    Pt/Caregiver instructed on plan of care and are agreeable to plan of care at this time. Physician Dr. Wilbert Mancini notified of patient admission to home health and plan of care including anticipated frequency of 1w1 3w2 for PT   Discharge planning discussed with patient and caregiver.   Discharge planning as follows: dc to family with MD supervision when all goals are met . Pt/Caregiver did verbalize understanding of discharge planning. Next MD appointment 5/9/22 Patient/caregiver encouraged/instructed to keep appointment as lack of follow through with physician appointment could result in discontinuation of home care services for non-compliance. PMHx: Dyslipidemia      Headache syndrome - multifactorial t o include sinus, tension, SCM spasms, migraine      Irritable bowel syndrome with diarrhea      CAD involving native coronary artery of native heart without angina pectoris      Chronic UTI (urinary tract infection)      Primary hypertension      GERD without esophagitis      Primary osteoarthritis of right knee   S: pain on R knee 8/10 that is managed by pain medication as needed and ice x 20 minutes at a time   O:Patients Goals= Be able to go back to PLOF  Wound/Incision: location, description, drainage: intact prineo tape on R knee surgical incision without any drainage noted, Noted bruising on lateral aspect of R leg and hamstring area   PLOF: Lives in a 1 level house by herself, prior to surgery, she was independent with ADL's and IADL's with difficulty due to chronic pain on R knee and did not use any AD for gait and was able to drive  STRENGTH R knee flexor 2+/5, R knee extensor 3-/5, LLE wfl  ROM R knee 10-40  BALANCE  Tinetti 11/28 high fall risk due to reduced strength on RLE, gait deviation and decreased efficiency of balance reactions. Patient demonstrated poor use of hip and ankle strategy during standing balance activity. Patient requires support from AD at all times for safety and stability. GAIT Patient was able to ambulate with RW x 30 feet with supervision with antalgic gait, decreased step length and stride length on RLE with wide ASHLEIGH and slow paced.  Patient was educated with heel to toe gait pattern technique to prevent LOB and falls   BED MOBILITY Patient needed Min A x1 with bed mobility   TRANSFERS Patient needed Min A x1 with verbal cues to and from bed, chair and toilet using RW   A: PT evaluation completed with the presence of daughter Saúl Lee  who is the primary CG, POC established, Med rec done, HEP established  P:Home Safety eval/recommendations: Home health physical therapy initial evaluation performed. Patient demonstrates decreased strength, balance, and endurance which increases patient's overall fall risk and burden of care. Patient would benefit from home health physical therapy to improve balance, strength, and endurance which would decrease fall risk and allow patient to return to prior level of function once all functional goals or full rehab potential is met. patient was educated with HEP including supine ankle oumps, ankle circles, quad sets, hamstring sets, heel slides and gluteal sets x 20 reps x 3 sets daily to improve ROM and MMT on R knee to improve gait and transfers followed by ice x 20 minutes at a time on R knee to decrease pain and swelling.  Patient educated with fall prevention technique by decluttering space, proper use of AD and footwear

## 2022-04-04 VITALS
SYSTOLIC BLOOD PRESSURE: 116 MMHG | HEART RATE: 64 BPM | DIASTOLIC BLOOD PRESSURE: 68 MMHG | OXYGEN SATURATION: 96 % | TEMPERATURE: 97.4 F | RESPIRATION RATE: 14 BRPM

## 2022-04-04 NOTE — HOME HEALTH
Subjective: Patient relays that she had noted increased symptoms and increasing bruising throughout lower extremity. She reports that she has been trying to complete her given HEP as instructed and is icing regularly in an attempt to manage her pain better. Objective: Skilled home health physical therapy interventions completed today listed in care plan section. Interventions performed today to assist in return to prior level of function, address deficits as apparent upon time of initial evaluation, return to community and personal activities, and progress further towards goals as previously established in plan of care. There are not any changes to medications upon timing of this visit. Home health supplies were not ordered/delivered today. Noted post surgical bruising throughout lower right lower extremity and is edematous. Visual inspection finds no drainage from incision. Ruddy's sign negative. Assessment:  Patient is progressing towards goals as previously established in POC with skilled home health physical therapy services at this time as made apparent by reports of HEP compliance and overall improvements in transfer ability. Reported decreased overall discomfort when elevating lower extremity as instructed during trial after training for proper form/postioning. Reported decreased pain level upon treatment completion Was able to safely navigate baby gate placed between living room and kitchen after training for form and proper management of FWW. Family/caregiver daughter involvement is present/set-up at this point in time and assists with meals, transport, ADLs as necessary, and general support. Plan:  Discharge planning discussed at this visit regarding eventual discharge once patient has met goals and/or met maximum benefit from home health skilled PT services with patient understanding and agreeable at this time.  Continued need for skilled home health PT at this time to address deficits, reduce risk of falls, and obtain goals as previously established per plan of care. Monitor symptoms, post surgical bruising, and edema.

## 2022-04-05 ENCOUNTER — HOME CARE VISIT (OUTPATIENT)
Dept: HOME HEALTH SERVICES | Facility: HOME HEALTH | Age: 67
End: 2022-04-05
Payer: COMMERCIAL

## 2022-04-06 ENCOUNTER — HOME CARE VISIT (OUTPATIENT)
Dept: SCHEDULING | Facility: HOME HEALTH | Age: 67
End: 2022-04-06
Payer: COMMERCIAL

## 2022-04-06 VITALS
SYSTOLIC BLOOD PRESSURE: 155 MMHG | TEMPERATURE: 97.8 F | RESPIRATION RATE: 16 BRPM | OXYGEN SATURATION: 97 % | DIASTOLIC BLOOD PRESSURE: 70 MMHG | HEART RATE: 88 BPM

## 2022-04-06 PROCEDURE — G0157 HHC PT ASSISTANT EA 15: HCPCS

## 2022-04-06 NOTE — HOME HEALTH
SUBJECTIVE: Patient reported feeling pain #6/10 from her R knee. CAREGIVER INVOLVEMENT/ASSISTANCE NEEDED FOR: Pt's daughter and friends assists with transportation and IADLS. HOME HEALTH SUPPLIES BY TYPE AND QUANTITY ORDERED/DELIVERED THIS VISIT INCLUDE: None needed for this visit. OBJECTIVE:  See interventions. PATIENT RESPONSE TO TREATMENT: Patient reported feeling slight increase in R knee pain #7/10 after walking and exercising this afternoon. PATIENT LEVEL OF UNDERSTANDING OF EDUCATION PROVIDED: Patient verbalized understanding on using ice, narcotic meds, pain management techniques, fall prevention, and signs/symptoms of infection. ASSESSMENT OF PROGRESS TOWARD GOALS: Patient is progressing toward goals for goals for gait and bed mobility. Pt previously need Supervision to ambulate safely but needed SBA for gait training today. Patient ambulated with decreased jasmyne, decreased foot clearance, and forward flexed trunk. Gave multiple vc's for pt to lift B foot higher to clear floor safely and to maintain erect posture. Pt needs reinforcement for safety with gait and to improve posture. Pt also previously needed Min A to get in and out of bed from PT initial evaluation, but progressed to Supervision for bed mobility training today. Pt needed extra time but demonstrated good technique for bed mobility. In addition, pt's R knee ROM is -12 to 92 degrees in sitting. CONTINUED NEED FOR THE FOLLOWING SKILLS: Gait Training, Stairs Training, Transfer Training, Bed Mobility, ROM, and Therapeutic Exercises. PLAN FOR NEXT VISIT: Patient will be seen 1w1 3w2 2w1 to increase safety with gait, to improve transfer technique, and to increase strength of B LE. DISCHARGE PLANNING DISCUSSED WITH PATIENT/CAREGIVER: Discharge patient to family with MD supervision when all goals are met. Pt/Caregiver did verbalize understanding of discharge planning.

## 2022-04-08 ENCOUNTER — HOME CARE VISIT (OUTPATIENT)
Dept: SCHEDULING | Facility: HOME HEALTH | Age: 67
End: 2022-04-08
Payer: COMMERCIAL

## 2022-04-08 VITALS
DIASTOLIC BLOOD PRESSURE: 88 MMHG | HEART RATE: 73 BPM | RESPIRATION RATE: 18 BRPM | SYSTOLIC BLOOD PRESSURE: 145 MMHG | TEMPERATURE: 97.5 F | OXYGEN SATURATION: 96 %

## 2022-04-08 PROCEDURE — G0157 HHC PT ASSISTANT EA 15: HCPCS

## 2022-04-08 NOTE — HOME HEALTH
SUBJECTIVE: Patient reported feeling decreased pain #4/10 from her R knee this afternoon. CAREGIVER INVOLVEMENT/ASSISTANCE NEEDED FOR: Pt's daughter and friends assists with transportation and IADLS. HOME HEALTH SUPPLIES BY TYPE AND QUANTITY ORDERED/DELIVERED THIS VISIT INCLUDE: None needed for this visit. OBJECTIVE:  See interventions. PATIENT RESPONSE TO TREATMENT: Patient reported feeling no increase in R knee pain after walking outside and negotiating stairs. PATIENT LEVEL OF UNDERSTANDING OF EDUCATION PROVIDED: Patient verbalized understanding on using ice, narcotic meds, pain management techniques, fall prevention, and signs/symptoms of infection. ASSESSMENT OF PROGRESS TOWARD GOALS: Patient is progressing toward goals for goals for gait and stairs. Pt continues to need SBA to ambulate safely for gait training today. Patient ambulated with decreased jasmyne, decreased foot clearance, and decreased R knee flexion. Gave multiple vc's for pt to lift B foot higher to clear floor safely and to bend R knee during swing phase of gait. Pt needs reinforcement for safety with gait and to improve gait mechanics. Pt also previously did not go up and down stairs but progressed to SBA for stairs training today. Pt led with her L LE to go up stairs and led with her R LE to go down stairs. Gave vc's to place entire foot on each step for safety. In addition, pt's R knee ROM improved from   -12 to 92 degrees to -8 to 95 degrees in sitting. CONTINUED NEED FOR THE FOLLOWING SKILLS: Gait Training, Stairs Training, Transfer Training, Bed Mobility, ROM, and Therapeutic Exercises. PLAN FOR NEXT VISIT: Patient will be seen 3w2 2w1 to increase safety with gait, to improve transfer technique, and to increase strength of B LE. DISCHARGE PLANNING DISCUSSED WITH PATIENT/CAREGIVER: Discharge patient to family with MD supervision when all goals are met. Pt/Caregiver did verbalize understanding of discharge planning.

## 2022-04-11 ENCOUNTER — HOME CARE VISIT (OUTPATIENT)
Dept: SCHEDULING | Facility: HOME HEALTH | Age: 67
End: 2022-04-11
Payer: COMMERCIAL

## 2022-04-11 VITALS
SYSTOLIC BLOOD PRESSURE: 149 MMHG | HEART RATE: 88 BPM | DIASTOLIC BLOOD PRESSURE: 90 MMHG | TEMPERATURE: 97.8 F | OXYGEN SATURATION: 95 % | RESPIRATION RATE: 16 BRPM

## 2022-04-11 PROCEDURE — G0157 HHC PT ASSISTANT EA 15: HCPCS

## 2022-04-11 NOTE — HOME HEALTH
SUBJECTIVE: Patient reported feeling pain #6/10 from her R knee this afternoon but was as high as #8/10 last night. CAREGIVER INVOLVEMENT/ASSISTANCE NEEDED FOR: Pt's daughter and friends assists with transportation and IADLS. HOME HEALTH SUPPLIES BY TYPE AND QUANTITY ORDERED/DELIVERED THIS VISIT INCLUDE: None needed for this visit. OBJECTIVE:  See interventions. PATIENT RESPONSE TO TREATMENT: Patient reported feeling increased pain in R knee #7/10 after walking and exercising. PATIENT LEVEL OF UNDERSTANDING OF EDUCATION PROVIDED: Patient repeated back teaching on using ice, narcotic meds, pain management techniques, fall prevention, and signs/symptoms of infection. ASSESSMENT OF PROGRESS TOWARD GOALS: Patient is progressing toward goals for goals for gait and stairs. Pt previously needed SBA to ambulate safely but progressed to close S for gait training today. Patient ambulated with decreased jasmyne, decreased foot clearance, and decreased R knee flexion. Gave multiple vc's for pt to lift B foot higher to clear floor safely and to bend R knee during swing phase of gait. Pt demonstrated increased safety with gait and improved gait mechanics after giving verbal cues. Pt also previously needed Supervision to get in and out of bed and to scoot L < > R in bed but progressed to I for bed mobility training today. In addition, pt's R knee ROM improved from -12 to 92 degrees to -6 to 96 degrees in sitting. CONTINUED NEED FOR THE FOLLOWING SKILLS: Gait Training, Stairs Training, Transfer Training, Bed Mobility, ROM, and Therapeutic Exercises. PLAN FOR NEXT VISIT: Patient will be seen 2w1 3w1 2w1 to increase safety with gait, to improve transfer technique, and to increase strength of B LE. DISCHARGE PLANNING DISCUSSED WITH PATIENT/CAREGIVER: Discharge patient to family with MD supervision when all goals are met. Pt/Caregiver did verbalize understanding of discharge planning.

## 2022-04-13 ENCOUNTER — HOME CARE VISIT (OUTPATIENT)
Dept: SCHEDULING | Facility: HOME HEALTH | Age: 67
End: 2022-04-13
Payer: COMMERCIAL

## 2022-04-13 VITALS
DIASTOLIC BLOOD PRESSURE: 85 MMHG | HEART RATE: 87 BPM | RESPIRATION RATE: 18 BRPM | SYSTOLIC BLOOD PRESSURE: 133 MMHG | TEMPERATURE: 98.1 F | OXYGEN SATURATION: 97 %

## 2022-04-13 PROCEDURE — G0157 HHC PT ASSISTANT EA 15: HCPCS

## 2022-04-14 NOTE — HOME HEALTH
SUBJECTIVE: Patient reported feeling pain #6/10 from her R knee this afternoon but was as high as #8/10 last night again. CAREGIVER INVOLVEMENT/ASSISTANCE NEEDED FOR: Pt's daughter and friends assists with transportation and IADLS. HOME HEALTH SUPPLIES BY TYPE AND QUANTITY ORDERED/DELIVERED THIS VISIT INCLUDE: None needed for this visit. OBJECTIVE:  See interventions. PATIENT RESPONSE TO TREATMENT: Patient reported feeling increased pain in R knee #7/10 after walking and exercising. PATIENT LEVEL OF UNDERSTANDING OF EDUCATION PROVIDED: Goal met. ASSESSMENT OF PROGRESS TOWARD GOALS: Patient is progressing toward goals for goals for gait and stairs. Pt previously needed close S to ambulate safely but progressed to S for gait training today. Patient ambulated with decreased jasmyne, decreased foot clearance, and decreased R knee flexion. Gave multiple vc's for pt to lift B foot higher to clear floor safely and to bend R knee during swing phase of gait. Pt demonstrated increased safety with gait and improved gait mechanics after giving verbal cues. Pt also previously needed SBA to go up and down stairs but progressed to Supervision for stairs training today. In addition, pt's R knee ROM was -2 - 93 degrees in sitting. CONTINUED NEED FOR THE FOLLOWING SKILLS: Gait Training, Stairs Training, Transfer Training, Bed Mobility, ROM, and Therapeutic Exercises. PLAN FOR NEXT VISIT: Patient will be seen 1w1 3w1 2w1 to increase safety with gait, to improve transfer technique, and to increase strength of B LE. DISCHARGE PLANNING DISCUSSED WITH PATIENT/CAREGIVER: Discharge patient to family with MD supervision when all goals are met. Pt/Caregiver did verbalize understanding of discharge planning.

## 2022-04-15 ENCOUNTER — HOME CARE VISIT (OUTPATIENT)
Dept: SCHEDULING | Facility: HOME HEALTH | Age: 67
End: 2022-04-15
Payer: COMMERCIAL

## 2022-04-15 VITALS
HEART RATE: 80 BPM | OXYGEN SATURATION: 99 % | SYSTOLIC BLOOD PRESSURE: 145 MMHG | TEMPERATURE: 97.8 F | RESPIRATION RATE: 15 BRPM | DIASTOLIC BLOOD PRESSURE: 95 MMHG

## 2022-04-15 PROCEDURE — G0157 HHC PT ASSISTANT EA 15: HCPCS

## 2022-04-15 NOTE — HOME HEALTH
SUBJECTIVE: Patient reported R knee pain #6/10 again this afternoon with increased pain at night time #8/10. CAREGIVER INVOLVEMENT/ASSISTANCE NEEDED FOR: Pt's daughter and friends assists with transportation and IADLS. HOME HEALTH SUPPLIES BY TYPE AND QUANTITY ORDERED/DELIVERED THIS VISIT INCLUDE: None needed for this visit. OBJECTIVE:  See interventions. PATIENT RESPONSE TO TREATMENT: Patient reported feeling increased pain in R knee #7/10 after walking outside and negotiating stairs. Pt feels increased tightness and swelling from her R knee as well. PATIENT LEVEL OF UNDERSTANDING OF EDUCATION PROVIDED: Goal met. ASSESSMENT OF PROGRESS TOWARD GOALS: Patient is progressing toward goals for goals for gait and stairs. Pt continues to need Supervision to ambulate safely but progressed to ambulating longer distance for gait training over uneven surfaces today. Patient ambulated with improved posture but with decreased foot clearance, and forward flexed trunk. Gave vc's for pt to lift B foot higher to clear floor safely. Pt demonstrated increased foot clearance after giving verbal cues. Pt also previously needed Supervision to go up and down stairs but progressed to distant S for stairs training today. In addition, pt's R knee ROM measured -8 - 94 degrees in sitting. Instructed pt on perforing light massage and stretching to relax mm and improve ROM of R knee. CONTINUED NEED FOR THE FOLLOWING SKILLS: Gait Training, Stairs Training, Transfer Training, Bed Mobility, ROM, and Therapeutic Exercises. PLAN FOR NEXT VISIT: Patient will be seen 3w1 2w1 to increase safety with gait, to improve transfer technique, and to increase strength of B LE. DISCHARGE PLANNING DISCUSSED WITH PATIENT/CAREGIVER: Discharge patient to family with MD supervision when all goals are met. Pt/Caregiver did verbalize understanding of discharge planning.

## 2022-04-18 ENCOUNTER — HOME CARE VISIT (OUTPATIENT)
Dept: SCHEDULING | Facility: HOME HEALTH | Age: 67
End: 2022-04-18
Payer: COMMERCIAL

## 2022-04-18 VITALS
DIASTOLIC BLOOD PRESSURE: 87 MMHG | HEART RATE: 83 BPM | TEMPERATURE: 98 F | OXYGEN SATURATION: 98 % | SYSTOLIC BLOOD PRESSURE: 117 MMHG

## 2022-04-18 PROCEDURE — G0151 HHCP-SERV OF PT,EA 15 MIN: HCPCS

## 2022-04-19 NOTE — HOME HEALTH
S: patient reports that she is doing well but she is still feeling really tight - she reports using the cane sometimes and still using the walker sometimes  O: PAIN: patient is taking pain medication on schedule/as needed, educated patient on use of ice for pain and edema  patient to apply ice to the right knee for pain and swelling control per MD protocol using a barrier to protect the skin. Patient to monitor the skin for any adverse effects such as color changes, irritation, mottled appearance. Patient to use ice for 20 minutes 4-5 times a day. WOUND:R knee covered in the prineo tape - surrouding the bandage there is moderate swelling without redness. per MD orders did not remove the tape but educated patient on signs of infection and to NOT remove the tape - as it peels up she is able to trim it from the edges   ROM: R knee extension in propped quad set -5 degreees  R knee extension in supine -4 degrees  R knee flexion 100 degrees in sitting  R knee flexion 98 degrees in supine heel slide   STRENGTH: R knee ext 3-/5, R knee flexion 3-/5, R hip flex 4-/5, R hip abd/adduction 4-/5  L knee flexion and extension 5/5, L hip flexion/abduction and adduction 5/5  BED MOBILITY: patient is independent with the bed mobility    EQUIPMENT: FWW, SPC  TRANSFERS: patient completed sit to stand with significant weight shift to the left side to unweight the R LE. with cues she was able to shift back toward the right side and create a more equal base during transitions.  She was encouraged to attempt to equalize weight bearing during sit to stand and stand to sit each time she performed the transfers   GAIT: ambulating in the home with FWW with symmetrical steps and good stability - she and LPTA report she has started training with the cane- she demonstrated use of the [facility] - she demonstrated correct sequencing, symmetrical step length and goot foot clearance - she was educated on recommendation to use the cane in the home as she feels comfortable  STEPS: patient demonstrated going up and down the steps to the garage where her laundry is - she went down and up using the cane and one hand on the railing with SBA and cues for sequencing   RESPONSE TO TREATMENT:patient demonstrated a positive outcome post treatment and reported a reduction in pain, increased comfort and increased confidence with transfers and mobility using the cane. Patient reported good understanding of the HEP and reports confidence in ability to complete the program as prescribed by PT  RESPONSE TO EDUCATION: patient was educated on: safety, HEP, signs of infection - patient expressed understanding   Patient expressed understanding of all education provided and was able to repeat back education, precautions, and HEP. A:ASSESSMENT AND PROGRESS TOWARD GOALS:  Patient demonstrated a positive result to therapy this date as evidenced by an improvement in gait pattern with cues with partial transition to the cane, decreased pain with exercise and walking and patient expressing an understanding of the rehab plan due to therapy verbal and written instructions. Goals reviewed/updated for increased independence in the home, safe mobility in the home, improvement in strength and ROM - all designed to reduce fall risk and progress toward independence. Patient will benefit from continued PT intervention to progress toward meeting all established goals    P:.continue with progression of mobility, ther ex for strength, ROM, provide education to patient and caregivers to maximize the recovery and reduce the fall risk to progress toward a return to independent function    PMH/Summary of clinical condition: s/p R TKR by Dr Sly Younger - goes back to Dr Sly Younger    Medications review completed.     medications are effective at this time  no changes in medication reported    social history/ caregivers:patient lives alone in a one story house (but her brother from out of town is currently staying with her for an undetermined amount of time) with steps to enter and egress her home. Her brother is her primary caregiver and assists with meals, medications, transportation and mobility as needed    Discharge planning discussed with patient and caregiver. Discharge planning as follows: DC to self and family under MD supervision when all goals are met or maximum potential is reached  Pt/Caregiver did verbalize understanding. Patient education provided this visit:safety, HEP, progression of exercises/mobility, and signs of infection- she expressed understanding    Home exercise program: HEP consisting of:    1. Walking every hour during the daytime for 3-5 minutes using FWW.    2. Supine: APs, heel slides, quad sets, and SAQ x 10 reps each, 3x/day. 3. Sitting: APs, R LAQ and R knee flexion x 10 reps each 3x/day. Written instructions issued. Patient/caregiver verbalized understanding. Continued need for the following skills: MD referral for HHPT following recent hospital stay. HHPT is medically necessary to address  dx and clinical findings: decreased ROM, decreased strength, impaired gait, decreased ability w stair negotiation, increased swelling, decreased transfer status, decreased endurance, decreased balance and decreased safety, increased pain in order to improve functional mobility/quality of life, decrease burden of care, reduce risk for re-hospitalization, work towards patient's personal goals of return to PLOF w decrease risk for falls.

## 2022-04-20 ENCOUNTER — HOME CARE VISIT (OUTPATIENT)
Dept: SCHEDULING | Facility: HOME HEALTH | Age: 67
End: 2022-04-20
Payer: COMMERCIAL

## 2022-04-20 VITALS
HEART RATE: 87 BPM | DIASTOLIC BLOOD PRESSURE: 78 MMHG | OXYGEN SATURATION: 96 % | SYSTOLIC BLOOD PRESSURE: 133 MMHG | TEMPERATURE: 98.1 F | RESPIRATION RATE: 18 BRPM

## 2022-04-20 PROCEDURE — G0157 HHC PT ASSISTANT EA 15: HCPCS

## 2022-04-21 NOTE — HOME HEALTH
SUBJECTIVE: Patient reported R knee pain #4/10 again this afternoon with increased pain at night time #8/10. CAREGIVER INVOLVEMENT/ASSISTANCE NEEDED FOR: Pt's daughter and friends assists with transportation and IADLS. HOME HEALTH SUPPLIES BY TYPE AND QUANTITY ORDERED/DELIVERED THIS VISIT INCLUDE: None needed for this visit. OBJECTIVE:  See interventions. PATIENT RESPONSE TO TREATMENT: Patient reported feeling increased pain in R knee after walking outside and negotiating stairs. Her R knee buckled twice due to weakness and pain. PATIENT LEVEL OF UNDERSTANDING OF EDUCATION PROVIDED: Goal met. ASSESSMENT OF PROGRESS TOWARD GOALS: Patient is progressing toward goals for goals for gait and stairs. Pt continues to need Supervision to ambulate safely using [facility] but progressed to ambulating longer distance using [facility] for gait training outside over uneven surfaces today. Patient ambulated with good step length and improved posture but with decreased foot clearance. Gave multiple vc's for pt to lift B foot higher to clear floor safely. Pt needs reinforcement for safety with gait and to increase foot clearance. Pt also previously needed distant S to go up and down stairs but progressed to Mod I for stairs training today. Pt led with her L LE to go up stairs and led with her R LE to go down stairs. Pt demonstrated good safety awareness and improved ability to negotiate stairs independently using SPC. In addition, pt's R knee ROM improved to 0 - 106 degrees in sitting. Discussed with patient/CG plan to discharge pt from [facility] PT services next week. Pt/CG verbalized understanding and is in agreement with discharge plan. CONTINUED NEED FOR THE FOLLOWING SKILLS: Gait Training, Stairs Training, Transfer Training, Bed Mobility, ROM, and Therapeutic Exercises. PLAN FOR NEXT VISIT: Patient will be seen 1w1 2w1 to increase safety with gait, to improve transfer technique, and to increase strength of B LE.   DISCHARGE PLANNING DISCUSSED WITH PATIENT/CAREGIVER: Discharge patient to family with MD supervision when all goals are met. Pt/Caregiver did verbalize understanding of discharge planning.

## 2022-04-22 ENCOUNTER — HOME CARE VISIT (OUTPATIENT)
Dept: SCHEDULING | Facility: HOME HEALTH | Age: 67
End: 2022-04-22
Payer: COMMERCIAL

## 2022-04-22 VITALS
SYSTOLIC BLOOD PRESSURE: 140 MMHG | RESPIRATION RATE: 18 BRPM | OXYGEN SATURATION: 98 % | TEMPERATURE: 97.6 F | HEART RATE: 96 BPM | DIASTOLIC BLOOD PRESSURE: 90 MMHG

## 2022-04-22 PROCEDURE — G0157 HHC PT ASSISTANT EA 15: HCPCS

## 2022-04-22 NOTE — HOME HEALTH
SUBJECTIVE: Patient reported R knee pain #5/10 again this afternoon. CAREGIVER INVOLVEMENT/ASSISTANCE NEEDED FOR: Pt's daughter and friends assists with transportation and IADLS. HOME HEALTH SUPPLIES BY TYPE AND QUANTITY ORDERED/DELIVERED THIS VISIT INCLUDE: None needed for this visit. OBJECTIVE:  See interventions. PATIENT RESPONSE TO TREATMENT: Patient reported feeling increased pain in R knee after walking outside and negotiating stairs   PATIENT LEVEL OF UNDERSTANDING OF EDUCATION PROVIDED: Goal met. ASSESSMENT OF PROGRESS TOWARD GOALS: Patient is progressing toward goals for goals for gait and transfers. Pt previously needed Supervision to ambulate safely using SPC but progressed to Mod I for gait training inside her home today. Pt ambulated with slight R antalgic gait but demonstrated improved stability. Pt also progressed to performing car transfer Mod I. In addition, pt's R knee ROM improved from 0-106 to 0 - 108 degrees in sitting. Discussed with patient/CG plan to discharge pt from [facility] PT services next week. Pt/CG verbalized understanding and is in agreement with discharge plan. CONTINUED NEED FOR THE FOLLOWING SKILLS: Gait Training, Stairs Training, Transfer Training, Bed Mobility, ROM, and Therapeutic Exercises. PLAN FOR NEXT VISIT: Patient will be seen 2w1 to increase safety with gait, to improve transfer technique, and to increase strength of B LE. DISCHARGE PLANNING DISCUSSED WITH PATIENT/CAREGIVER: Discharge patient to family with MD supervision when all goals are met. Pt/Caregiver did verbalize understanding of discharge planning.

## 2022-04-25 ENCOUNTER — HOME CARE VISIT (OUTPATIENT)
Dept: SCHEDULING | Facility: HOME HEALTH | Age: 67
End: 2022-04-25
Payer: COMMERCIAL

## 2022-04-25 VITALS
TEMPERATURE: 98.1 F | RESPIRATION RATE: 18 BRPM | OXYGEN SATURATION: 99 % | DIASTOLIC BLOOD PRESSURE: 90 MMHG | SYSTOLIC BLOOD PRESSURE: 125 MMHG | HEART RATE: 80 BPM

## 2022-04-25 PROCEDURE — G0157 HHC PT ASSISTANT EA 15: HCPCS

## 2022-04-25 NOTE — HOME HEALTH
SUBJECTIVE: Patient had no c/o this afternoon. CAREGIVER INVOLVEMENT/ASSISTANCE NEEDED FOR: Pt's daughter and friends assists with transportation and IADLS. HOME HEALTH SUPPLIES BY TYPE AND QUANTITY ORDERED/DELIVERED THIS VISIT INCLUDE: None needed for this visit. OBJECTIVE:  See interventions. PATIENT RESPONSE TO ROSALINDA ATMENT: Patient reported feeling increased pain in R knee after walking outside and negotiating stairs. PATIENT LEVEL OF UNDERSTANDING OF EDUCATION PROVIDED: Goal met. ASSESSMENT OF PROGRESS TOWARD GOALS: Patient is progressing toward goals for goals for gait, stairs, and balance. Pt continues to need Mod I to ambulate safely using SPC but progressed to ambulating longer distance for gait training outside over uneven surfaces today. Pt ambulated with slight R antalgic gait but demonstrated improved stability using SPC. Pt also progressed to Mod I using SPC for stairs training today. Pt led with her L LE to go up stairs and led with her R LE to go down stairs. In addition, she demonstrated improved balance as evidence by improving his Tinetti balance assessment score from 11/28 from PT initial evaluation to 24/28 today but her R knee ROM is still to 0 - 108 degrees in sitting. Discussed with patient/CG plan to discharge pt from [facility] PT services this week. Pt/CG verbalized understanding and is in agreement with discharge plan. CONTINUED NEED FOR THE FOLLOWING SKILLS: None. PLAN FOR NEXT VISIT: Patient will be seen 1w1 to reassess safety with gait, stairs, transfers, balance, and to increase strength of B LE. DISCHARGE PLANNING DISCUSSED WITH PATIENT/CAREGIVER: Discharge patient to family with MD supervision when all goals are met. Pt/Caregiver did verbalize understanding of discharge planning.

## 2022-04-26 ENCOUNTER — HOME CARE VISIT (OUTPATIENT)
Dept: SCHEDULING | Facility: HOME HEALTH | Age: 67
End: 2022-04-26
Payer: COMMERCIAL

## 2022-04-26 VITALS
TEMPERATURE: 98.6 F | RESPIRATION RATE: 15 BRPM | OXYGEN SATURATION: 98 % | SYSTOLIC BLOOD PRESSURE: 138 MMHG | DIASTOLIC BLOOD PRESSURE: 80 MMHG | HEART RATE: 93 BPM

## 2022-04-26 PROCEDURE — G0151 HHCP-SERV OF PT,EA 15 MIN: HCPCS

## 2022-04-27 NOTE — HOME HEALTH
S: patient reports that she is doing well and her brother went home - she is not using the cane in the home but continues to use it when she egresses the house    O: PAIN: patient is taking pain medication on schedule/as needed, educated patient on use of ice for pain and edema  patient to continue to apply ice to the right knee for pain and swelling control per MD protocol using a barrier to protect the skin. Patient to monitor the skin for any adverse effects such as color changes, irritation, mottled appearance. Patient to use ice for 20 minutes 4-5 times a day. WOUND:R knee covered in the prineo tape - surrouding the bandage there is min to moderate swelling without redness. per MD orders did not remove the tape but educated patient on signs of infection and to NOT remove the tape - as it peels up she is able to trim it from the edges     ROM: R knee extension in propped quad set -5 degreees AT REASSESSMENT  R knee extension in supine -4 degrees AT REASSESSMENT  R knee flexion 100 degrees in sitting AT REASSESSMENT  R knee flexion 98 degrees in supine heel slide AT REASSESSMENT    R knee flexion 107 degrees AT DISCHARGE  R knee extension 0 degrees AT DISCHARGE    STRENGTH: R knee ext 3+/5, R knee flexion 3-/5, R hip flex 4/5, R hip abd/adduction 4/5  L knee flexion and extension 5/5, L hip flexion/abduction and adduction 5/5    BED MOBILITY: patient is independent with the bed mobility      EQUIPMENT: FWW, SPC    TRANSFERS: patient is independent with all transfers in and out of the home     GAIT: She is walking with a single point cane on even surfaces independently and with a SPC with modified independence on uneven surfaces with symmetrical pattern and good safety awareness. STEPS:  She is able to egress and enter her house independently via steps at the front door and garage door.    RESPONSE TO TREATMENT:patient demonstrated a positive outcome post treatment and reported a reduction in pain, increased comfort and increased confidence with long distance walking outside Patient reported good understanding of the HEP and reports confidence in ability to complete the program as prescribed by PT    RESPONSE TO EDUCATION: patient was educated on: safety, HEP, signs of infection - patient expressed understanding   Patient expressed understanding of all education provided and was able to repeat back education, precautions, and HEP. A:ASSESSMENT AND PROGRESS TOWARD GOALS:  Patient has been seen for home care PT following a R TKR by Dr Benjamin Cheatham. She has made great progress in all areas. She in independent with all ADLs at this time and all transfers in and out of the home. She is walking with a single point cane on even surfaces independently and with a SPC with modified independence on uneven surfaces with symmetrical pattern and good safety awareness. She is able to egress and enter her house independently via steps at the front door and garage door. Current range of motion is R knee extension 0 degrees and R knee flexion 107 degrees. She is independent with her HEP and is consistent with follow through. She will discuss transition to outpatient therapy with Dr Benjamin Cheatham and is being discharged from home care PT today. P: DC PT       PMH/Summary of clinical condition: s/p R TKR by Dr Benjamin Cheatham - goes back to Dr Benjamin Cheatham    Medication reconciliation completed. medications are effective at this time  no changes in medication reported    social history/ caregivers:patient lives alone in a one story house with steps to enter and egress her home. She is independent with meals, medications, and mobility - she requires assistance for transportation    Patient education provided this visit:safety, HEP, progression of exercises/mobility, and signs of infection- she expressed understanding    Home exercise program: HEP consisting of:    1.  Walking every hour during the daytime for 3-5 minutes using FWW.    2. Supine: APs, heel slides, quad sets, and SAQ x 10 reps each, 3x/day. 3. Sitting: APs, R LAQ and R knee flexion x 10 reps each 3x/day. Written instructions issued. Patient/caregiver verbalized understanding.

## 2025-06-23 NOTE — Clinical Note
Patient has been seen for home care PT following a R TKR by Dr Nieves Hull. She has made great progress in all areas. She in independent with all ADLs at this time and all transfers in and out of the home. She is walking with a single point cane on even surfaces independently and with a SPC with modified independence on uneven surfaces with symmetrical pattern and good safety awareness. She is able to egress and enter her house independently via steps at the front door and garage door. Current range of motion is R knee extension 0 degrees and R knee flexion 107 degrees. She is independent with her HEP and is consistent with follow through. She will discuss transition to outpatient therapy with Dr Nieves Hull and is being discharged from home care PT today. I have personally seen and examined the patient. I have collaborated with and supervised the